# Patient Record
Sex: MALE | URBAN - METROPOLITAN AREA
[De-identification: names, ages, dates, MRNs, and addresses within clinical notes are randomized per-mention and may not be internally consistent; named-entity substitution may affect disease eponyms.]

---

## 2019-06-18 ENCOUNTER — APPOINTMENT (RX ONLY)
Dept: URBAN - METROPOLITAN AREA CLINIC 152 | Facility: CLINIC | Age: 34
Setting detail: DERMATOLOGY
End: 2019-06-18

## 2019-06-18 DIAGNOSIS — L0391 CELLULITIS AND ABSCESS OF UNSPECIFIED SITES: ICD-10-CM | Status: INADEQUATELY CONTROLLED

## 2019-06-18 DIAGNOSIS — L0390 CELLULITIS AND ABSCESS OF UNSPECIFIED SITES: ICD-10-CM | Status: INADEQUATELY CONTROLLED

## 2019-06-18 PROBLEM — L02.01 CUTANEOUS ABSCESS OF FACE: Status: ACTIVE | Noted: 2019-06-18

## 2019-06-18 PROCEDURE — ? SEPARATE AND IDENTIFIABLE DOCUMENTATION

## 2019-06-18 PROCEDURE — ? COUNSELING

## 2019-06-18 PROCEDURE — ? DIAGNOSIS COMMENT

## 2019-06-18 PROCEDURE — 99203 OFFICE O/P NEW LOW 30 MIN: CPT

## 2019-06-18 PROCEDURE — ? ORDER TESTS

## 2019-06-18 PROCEDURE — ? PRESCRIPTION

## 2019-06-18 RX ORDER — MUPIROCIN 20 MG/G
OINTMENT TOPICAL
Qty: 1 | Refills: 1 | Status: ERX | COMMUNITY
Start: 2019-06-18

## 2019-06-18 RX ORDER — SULFAMETHOXAZOLE AND TRIMETHOPRIM 800; 160 MG/1; MG/1
TABLET ORAL BID
Qty: 20 | Refills: 0 | Status: ERX | COMMUNITY
Start: 2019-06-18

## 2019-06-18 RX ADMIN — MUPIROCIN: 20 OINTMENT TOPICAL at 13:47

## 2019-06-18 RX ADMIN — SULFAMETHOXAZOLE AND TRIMETHOPRIM: 800; 160 TABLET ORAL at 13:44

## 2019-06-18 ASSESSMENT — LOCATION DETAILED DESCRIPTION DERM: LOCATION DETAILED: RIGHT SUPERIOR MEDIAL BUCCAL CHEEK

## 2019-06-18 ASSESSMENT — LOCATION SIMPLE DESCRIPTION DERM: LOCATION SIMPLE: RIGHT CHEEK

## 2019-06-18 ASSESSMENT — LOCATION ZONE DERM: LOCATION ZONE: FACE

## 2019-06-18 NOTE — PROCEDURE: COUNSELING
Patient Specific Counseling (Will Not Stick From Patient To Patient): -Discussed most likely diagnosis. Discussed it may have started off with foreign body reaction v. cyst v. bacterial infection \\n-Discussed treating with Bactrim PO BID for 10 days. Discussed treating topically with mupirocin BID\\n-Discussed using an antibacterial wash daily in the future, recommended 4-5% BP wash. Advised not to shave but trim instead
Detail Level: Simple

## 2019-06-18 NOTE — PROCEDURE: ORDER TESTS
Performing Laboratory: [id number]
Bill For Surgical Tray: no
Billing Type: Third-Party Bill
Expected Date Of Service: 06/18/2019
Lab Facility: [id number]

## 2019-06-18 NOTE — PROCEDURE: DIAGNOSIS COMMENT
Detail Level: Simple
Comment: Very indurated, do suspect an early ceullulitis, rec cx and Bactrim, advised to call if any SE from med, no fluctuance so no I and D at this time